# Patient Record
Sex: FEMALE | Race: BLACK OR AFRICAN AMERICAN | NOT HISPANIC OR LATINO | Employment: FULL TIME | URBAN - METROPOLITAN AREA
[De-identification: names, ages, dates, MRNs, and addresses within clinical notes are randomized per-mention and may not be internally consistent; named-entity substitution may affect disease eponyms.]

---
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
## 2025-01-07 ENCOUNTER — TELEPHONE (OUTPATIENT)
Dept: PEDIATRICS | Facility: CLINIC | Age: 20
End: 2025-01-07
Payer: MEDICARE

## 2025-01-07 NOTE — TELEPHONE ENCOUNTER
Copied from CRM #[id number]. Topic: Information Request - Doctor, Hospital, or Provider  >> Jan 7, 2025 10:25 AM Liz PRESTON wrote:  PT WOULD LIKE Yolanda Sarah MD TO GIVE HER A CALL IN REGARDS TO GETTING A REFERRAL FOR THERAPY AT [facility] WHERE HER INSURANCE IS APPROVED

## 2025-01-09 ENCOUNTER — TELEPHONE (OUTPATIENT)
Dept: PEDIATRICS | Facility: CLINIC | Age: 20
End: 2025-01-09
Payer: MEDICARE

## 2025-01-09 DIAGNOSIS — M54.9 CHRONIC BILATERAL BACK PAIN, UNSPECIFIED BACK LOCATION: Primary | ICD-10-CM

## 2025-01-09 DIAGNOSIS — G89.29 CHRONIC BILATERAL BACK PAIN, UNSPECIFIED BACK LOCATION: Primary | ICD-10-CM

## 2025-01-09 NOTE — TELEPHONE ENCOUNTER
----- Message from Nurse Yesika IZQUIERDO sent at 1/9/2025  9:54 AM EST -----  Regarding: PT referral  Contact: [phone]   Gisela Michelle 2005  calling again re PT referral  [phone]